# Patient Record
Sex: MALE | Race: WHITE | Employment: FULL TIME | URBAN - METROPOLITAN AREA
[De-identification: names, ages, dates, MRNs, and addresses within clinical notes are randomized per-mention and may not be internally consistent; named-entity substitution may affect disease eponyms.]

---

## 2019-11-08 ENCOUNTER — OFFICE VISIT (OUTPATIENT)
Dept: SURGERY | Facility: CLINIC | Age: 29
End: 2019-11-08
Payer: COMMERCIAL

## 2019-11-08 VITALS
SYSTOLIC BLOOD PRESSURE: 116 MMHG | BODY MASS INDEX: 20.32 KG/M2 | OXYGEN SATURATION: 98 % | DIASTOLIC BLOOD PRESSURE: 72 MMHG | WEIGHT: 150 LBS | HEIGHT: 72 IN | RESPIRATION RATE: 16 BRPM | HEART RATE: 81 BPM

## 2019-11-08 DIAGNOSIS — R10.31 RLQ ABDOMINAL PAIN: Primary | ICD-10-CM

## 2019-11-08 PROCEDURE — 99204 OFFICE O/P NEW MOD 45 MIN: CPT | Performed by: SURGERY

## 2019-11-08 RX ORDER — DEXTROAMPHETAMINE SACCHARATE, AMPHETAMINE ASPARTATE, DEXTROAMPHETAMINE SULFATE AND AMPHETAMINE SULFATE 5; 5; 5; 5 MG/1; MG/1; MG/1; MG/1
20 TABLET ORAL
COMMUNITY
Start: 2019-08-30

## 2019-11-08 RX ORDER — CITALOPRAM HYDROBROMIDE 20 MG/1
20 TABLET ORAL
COMMUNITY
Start: 2019-08-30

## 2019-11-08 ASSESSMENT — MIFFLIN-ST. JEOR: SCORE: 1673.4

## 2019-11-08 NOTE — LETTER
2019       Re: Manuel Ellsworth - 1990    Manuel is a 29 year old male who presents for evaluation of right lower quadrant abdominal discomfort.  Patient noticed this with physical activity and is been feeling bit better since he has been taking it a bit easy with lifting and physical movement.  The patient believes he feels an occasional lump in the right lower abdomen, though this is not something he can easily reproduce.  This is higher than his previous inguinal hernia repair.  The patient did have a right inguinal hernia repaired laparoscopically in .  This was done with a 3D max mesh a fixed in place with tacks and fibrin glue.     Past Medical History:  She denies any significant past medical history.     Past Surgical History:  Laparoscopic right inguinal hernia repair                Family History:  Patient denies any significant family history.      ROS:  The 10 point review of systems is negative other than noted in the HPI and above.     PE:    General- Well-developed, well-nourished, patient able to get up on table without difficulty.  HEENT- Normocephalic and atraumatic. Pupils equal and round.  Mucous membranes moist.  Sclera are nonicteric.  Neck- No lymphadenopathy or masses   Respirations- are regular and non labored  Abdomen is abdomen is soft without significant tenderness, masses, organomegaly or guarding.  Examination of the right lower quadrant where the patient indicates that he has symptoms reveals no obvious bulge.  The patient is very thin and there is some prominent musculature.  This is in the right lower quadrant near the arcuate line.  Hernia- Left inguinal hernia is not present with valsalva              Right inguinal hernia is not present with valsalva              Umbilical hernia is not present.              External genitalia are normal  Skin shows no evidence of jaundice.            Psychiatric-patient shows good insight into his  situation.     Assesment: Right lower quadrant abdominal wall pain     Plan: The patient is no evidence of a recurrent hernia.  He does have some symptoms in the right lower quadrant near the expected area of the arcuate line.  This could conceivably be the location of a spigelian hernia, but I am not able to palpate one on exam.  The patient is very thin and I think at this point a CT scan would be unlikely to show any clinically non-obvious hernia.  I explained that my preference would be to have him return in 2 to 3 months for reexamination.  If he notices any worsening of his symptoms he should return sooner.  If he ever notices a prominent bulge which will not go back in, he should seek help in the emergency room.  If the patient has continued symptoms at his next visit, we might give consideration to imaging.           Deni Dent MD

## 2019-11-08 NOTE — PROGRESS NOTES
HPI:  Manuel is a 29 year old male who presents for evaluation of right lower quadrant abdominal discomfort.  Patient noticed this with physical activity and is been feeling bit better since he has been taking it a bit easy with lifting and physical movement.  The patient believes he feels an occasional lump in the right lower abdomen, though this is not something he can easily reproduce.  This is higher than his previous inguinal hernia repair.  The patient did have a right inguinal hernia repaired laparoscopically in 2012.  This was done with a 3D max mesh a fixed in place with tacks and fibrin glue.    Past Medical History:  She denies any significant past medical history.    Past Surgical History:  Laparoscopic right inguinal hernia repair 2012       Social History:  Social History     Socioeconomic History     Marital status: Single     Spouse name: Not on file     Number of children: Not on file     Years of education: Not on file     Highest education level: Not on file   Occupational History     Not on file   Social Needs     Financial resource strain: Not on file     Food insecurity:     Worry: Not on file     Inability: Not on file     Transportation needs:     Medical: Not on file     Non-medical: Not on file   Tobacco Use     Smoking status: Never Smoker     Smokeless tobacco: Current User     Types: Chew   Substance and Sexual Activity     Alcohol use: Not on file     Drug use: Not on file     Sexual activity: Not on file   Lifestyle     Physical activity:     Days per week: Not on file     Minutes per session: Not on file     Stress: Not on file   Relationships     Social connections:     Talks on phone: Not on file     Gets together: Not on file     Attends Sabianist service: Not on file     Active member of club or organization: Not on file     Attends meetings of clubs or organizations: Not on file     Relationship status: Not on file     Intimate partner violence:     Fear of current or ex  partner: Not on file     Emotionally abused: Not on file     Physically abused: Not on file     Forced sexual activity: Not on file   Other Topics Concern     Not on file   Social History Narrative     Not on file        Family History:  Patient denies any significant family history.      ROS:  The 10 point review of systems is negative other than noted in the HPI and above.    PE:    General- Well-developed, well-nourished, patient able to get up on table without difficulty.  HEENT- Normocephalic and atraumatic. Pupils equal and round.  Mucous membranes moist.  Sclera are nonicteric.  Neck- No lymphadenopathy or masses   Respirations- are regular and non labored  Abdomen is abdomen is soft without significant tenderness, masses, organomegaly or guarding.  Examination of the right lower quadrant where the patient indicates that he has symptoms reveals no obvious bulge.  The patient is very thin and there is some prominent musculature.  This is in the right lower quadrant near the arcuate line.  Hernia- Left inguinal hernia is not present with valsalva              Right inguinal hernia is not present with valsalva   Umbilical hernia is not present.              External genitalia are normal  Skin shows no evidence of jaundice.            Psychiatric-patient shows good insight into his situation.    Assesment: Right lower quadrant abdominal wall pain    Plan: The patient is no evidence of a recurrent hernia.  He does have some symptoms in the right lower quadrant near the expected area of the arcuate line.  This could conceivably be the location of a spigelian hernia, but I am not able to palpate one on exam.  The patient is very thin and I think at this point a CT scan would be unlikely to show any clinically non-obvious hernia.  I explained that my preference would be to have him return in 2 to 3 months for reexamination.  If he notices any worsening of his symptoms he should return sooner.  If he ever notices a  prominent bulge which will not go back in, he should seek help in the emergency room.  If the patient has continued symptoms at his next visit, we might give consideration to imaging.        Deni Dent MD    Please route or send letter to:  Primary Care Provider (PCP)